# Patient Record
Sex: MALE | Race: WHITE | Employment: FULL TIME | URBAN - METROPOLITAN AREA
[De-identification: names, ages, dates, MRNs, and addresses within clinical notes are randomized per-mention and may not be internally consistent; named-entity substitution may affect disease eponyms.]

---

## 2021-08-30 ENCOUNTER — APPOINTMENT (OUTPATIENT)
Dept: GENERAL RADIOLOGY | Age: 41
End: 2021-08-30
Attending: EMERGENCY MEDICINE
Payer: COMMERCIAL

## 2021-08-30 ENCOUNTER — HOSPITAL ENCOUNTER (EMERGENCY)
Age: 41
Discharge: HOME | End: 2021-08-31
Attending: EMERGENCY MEDICINE
Payer: COMMERCIAL

## 2021-08-30 DIAGNOSIS — R07.89 ATYPICAL CHEST PAIN: Primary | ICD-10-CM

## 2021-08-30 DIAGNOSIS — R07.89 MUSCULOSKELETAL CHEST PAIN: ICD-10-CM

## 2021-08-30 LAB
ALBUMIN SERPL-MCNC: 4.3 G/DL (ref 3.5–5)
ALBUMIN/GLOB SERPL: 1 {RATIO} (ref 1.2–3.5)
ALP SERPL-CCNC: 132 U/L (ref 50–136)
ALT SERPL-CCNC: 97 U/L (ref 12–65)
ANION GAP SERPL CALC-SCNC: 9 MMOL/L (ref 7–16)
AST SERPL-CCNC: 28 U/L (ref 15–37)
ATRIAL RATE: 82 BPM
BASOPHILS # BLD: 0.1 K/UL (ref 0–0.2)
BASOPHILS NFR BLD: 1 % (ref 0–2)
BILIRUB SERPL-MCNC: 0.4 MG/DL (ref 0.2–1.1)
BUN SERPL-MCNC: 11 MG/DL (ref 6–23)
CALCIUM SERPL-MCNC: 9.4 MG/DL (ref 8.3–10.4)
CALCULATED P AXIS, ECG09: 52 DEGREES
CALCULATED R AXIS, ECG10: 26 DEGREES
CALCULATED T AXIS, ECG11: 35 DEGREES
CHLORIDE SERPL-SCNC: 104 MMOL/L (ref 98–107)
CO2 SERPL-SCNC: 23 MMOL/L (ref 21–32)
CREAT SERPL-MCNC: 0.96 MG/DL (ref 0.8–1.5)
DIAGNOSIS, 93000: NORMAL
DIFFERENTIAL METHOD BLD: NORMAL
EOSINOPHIL # BLD: 0.3 K/UL (ref 0–0.8)
EOSINOPHIL NFR BLD: 3 % (ref 0.5–7.8)
ERYTHROCYTE [DISTWIDTH] IN BLOOD BY AUTOMATED COUNT: 12.5 % (ref 11.9–14.6)
GLOBULIN SER CALC-MCNC: 4.1 G/DL (ref 2.3–3.5)
GLUCOSE SERPL-MCNC: 221 MG/DL (ref 65–100)
HCT VFR BLD AUTO: 48.5 % (ref 41.1–50.3)
HGB BLD-MCNC: 16.7 G/DL (ref 13.6–17.2)
IMM GRANULOCYTES # BLD AUTO: 0 K/UL (ref 0–0.5)
IMM GRANULOCYTES NFR BLD AUTO: 0 % (ref 0–5)
LIPASE SERPL-CCNC: 118 U/L (ref 73–393)
LYMPHOCYTES # BLD: 3.5 K/UL (ref 0.5–4.6)
LYMPHOCYTES NFR BLD: 32 % (ref 13–44)
MAGNESIUM SERPL-MCNC: 2.2 MG/DL (ref 1.8–2.4)
MCH RBC QN AUTO: 30 PG (ref 26.1–32.9)
MCHC RBC AUTO-ENTMCNC: 34.4 G/DL (ref 31.4–35)
MCV RBC AUTO: 87.1 FL (ref 79.6–97.8)
MONOCYTES # BLD: 0.6 K/UL (ref 0.1–1.3)
MONOCYTES NFR BLD: 6 % (ref 4–12)
NEUTS SEG # BLD: 6.4 K/UL (ref 1.7–8.2)
NEUTS SEG NFR BLD: 59 % (ref 43–78)
NRBC # BLD: 0 K/UL (ref 0–0.2)
P-R INTERVAL, ECG05: 154 MS
PLATELET # BLD AUTO: 239 K/UL (ref 150–450)
PMV BLD AUTO: 11.8 FL (ref 9.4–12.3)
POTASSIUM SERPL-SCNC: 3.7 MMOL/L (ref 3.5–5.1)
PROT SERPL-MCNC: 8.4 G/DL (ref 6.3–8.2)
Q-T INTERVAL, ECG07: 366 MS
QRS DURATION, ECG06: 112 MS
QTC CALCULATION (BEZET), ECG08: 427 MS
RBC # BLD AUTO: 5.57 M/UL (ref 4.23–5.6)
SODIUM SERPL-SCNC: 136 MMOL/L (ref 138–145)
TROPONIN-HIGH SENSITIVITY: 6.8 PG/ML (ref 0–14)
TROPONIN-HIGH SENSITIVITY: 7.2 PG/ML (ref 0–14)
VENTRICULAR RATE, ECG03: 82 BPM
WBC # BLD AUTO: 11 K/UL (ref 4.3–11.1)

## 2021-08-30 PROCEDURE — 83735 ASSAY OF MAGNESIUM: CPT

## 2021-08-30 PROCEDURE — 71046 X-RAY EXAM CHEST 2 VIEWS: CPT

## 2021-08-30 PROCEDURE — 93005 ELECTROCARDIOGRAM TRACING: CPT | Performed by: EMERGENCY MEDICINE

## 2021-08-30 PROCEDURE — 85025 COMPLETE CBC W/AUTO DIFF WBC: CPT

## 2021-08-30 PROCEDURE — 99283 EMERGENCY DEPT VISIT LOW MDM: CPT

## 2021-08-30 PROCEDURE — 84484 ASSAY OF TROPONIN QUANT: CPT

## 2021-08-30 PROCEDURE — 83690 ASSAY OF LIPASE: CPT

## 2021-08-30 PROCEDURE — 80053 COMPREHEN METABOLIC PANEL: CPT

## 2021-08-30 RX ORDER — CYCLOBENZAPRINE HCL 10 MG
10 TABLET ORAL
Status: DISCONTINUED | OUTPATIENT
Start: 2021-08-30 | End: 2021-08-31 | Stop reason: HOSPADM

## 2021-08-30 RX ORDER — SODIUM CHLORIDE 0.9 % (FLUSH) 0.9 %
5-10 SYRINGE (ML) INJECTION AS NEEDED
Status: DISCONTINUED | OUTPATIENT
Start: 2021-08-30 | End: 2021-08-31 | Stop reason: HOSPADM

## 2021-08-30 RX ORDER — SODIUM CHLORIDE 0.9 % (FLUSH) 0.9 %
5-10 SYRINGE (ML) INJECTION EVERY 8 HOURS
Status: DISCONTINUED | OUTPATIENT
Start: 2021-08-30 | End: 2021-08-31 | Stop reason: HOSPADM

## 2021-08-30 RX ORDER — CYCLOBENZAPRINE HCL 10 MG
10 TABLET ORAL
Qty: 13 TABLET | Refills: 0 | Status: SHIPPED | OUTPATIENT
Start: 2021-08-30

## 2021-08-30 RX ADMIN — Medication 5 ML: at 23:55

## 2021-08-30 NOTE — ED TRIAGE NOTES
Arrives with face mask in place. Reports left sided chest pain radiating into back left side of neck. Onset 3 days pta. States constant over last 2 days and describes as \"throbbing\". Associated nausea, shortness of breath, diaphoresis. Increased pain with movement/inspiration. Denies vomiting/diarrhea/fever/chills/cough. +smoker. Followed by [employer]. Reportedly completed shift at work today.

## 2021-08-31 VITALS
BODY MASS INDEX: 37.22 KG/M2 | TEMPERATURE: 98.2 F | HEIGHT: 74 IN | WEIGHT: 290 LBS | HEART RATE: 70 BPM | DIASTOLIC BLOOD PRESSURE: 59 MMHG | OXYGEN SATURATION: 96 % | SYSTOLIC BLOOD PRESSURE: 104 MMHG | RESPIRATION RATE: 20 BRPM

## 2021-08-31 NOTE — ED NOTES
I have reviewed discharge instructions with the patient. The patient verbalized understanding. Patient left ED via Discharge Method: ambulatory to Home     Opportunity for questions and clarification provided. Patient given 1 scripts. To continue your aftercare when you leave the hospital, you may receive an automated call from our care team to check in on how you are doing.  This is a free service and part of our promise to provide the best care and service to meet your aftercare needs. \" If you have questions, or wish to unsubscribe from this service please call [phone].  Thank you for Choosing our [address] [place] [address] Emergency Department.

## 2021-08-31 NOTE — ED PROVIDER NOTES
Patient presents the ER with complaints of chest discomfort. States since Friday he has had recurrence of chest discomfort particularly in his left chest.  Describes it as a tightness. States it got worse today. Reports he has had pain in the past.  Denies any direct trauma. The history is provided by the patient. Chest Pain   This is a recurrent problem. The current episode started 2 days ago. The problem has not changed since onset. The pain is present in the left side. The pain is at a severity of 4/10. The pain is moderate. The pain does not radiate. Associated symptoms include malaise/fatigue. Pertinent negatives include no abdominal pain, no back pain, no diaphoresis, no fever, no headaches, no leg pain, no nausea, no near-syncope, no orthopnea, no PND, no shortness of breath, no vomiting and no weakness. He has tried nothing for the symptoms. Past Medical History:   Diagnosis Date    Asthma        No past surgical history on file. No family history on file. Social History     Socioeconomic History    Marital status: SINGLE     Spouse name: Not on file    Number of children: Not on file    Years of education: Not on file    Highest education level: Not on file   Occupational History    Not on file   Tobacco Use    Smoking status: Former Smoker     Quit date: 11/1/2010     Years since quitting: 10.8   Substance and Sexual Activity    Alcohol use: No    Drug use: No    Sexual activity: Not on file   Other Topics Concern    Not on file   Social History Narrative    Not on file     Social Determinants of Health     Financial Resource Strain:     Difficulty of Paying Living Expenses:    Food Insecurity:     Worried About Running Out of Food in the Last Year:     [address] in the Last Year:    Transportation Needs:     Lack of Transportation (Medical):      Lack of Transportation (Non-Medical):    Physical Activity:     Days of Exercise per Week:     Minutes of Exercise per Session:    Stress:     Feeling of Stress :    Social Connections:     Frequency of Communication with Friends and Family:     Frequency of Social Gatherings with Friends and Family:     Attends Sikhism Services:     Active Member of Clubs or Organizations:     Attends Club or Organization Meetings:     Marital Status:    Intimate Partner Violence:     Fear of Current or Ex-Partner:     Emotionally Abused:     Physically Abused:     Sexually Abused: ALLERGIES: Patient has no known allergies. Review of Systems   Constitutional: Positive for malaise/fatigue. Negative for diaphoresis and fever. HENT: Negative for congestion, dental problem, trouble swallowing and voice change. Eyes: Negative for photophobia and redness. Respiratory: Negative for choking, chest tightness, shortness of breath and stridor. Cardiovascular: Positive for chest pain. Negative for orthopnea, leg swelling, PND and near-syncope. Gastrointestinal: Negative for abdominal pain, nausea and vomiting. Endocrine: Negative for polydipsia, polyphagia and polyuria. Genitourinary: Negative for penile swelling and scrotal swelling. Musculoskeletal: Negative for back pain and gait problem. Skin: Negative for color change and pallor. Neurological: Positive for light-headedness. Negative for seizures, speech difficulty, weakness and headaches. Hematological: Negative for adenopathy. Does not bruise/bleed easily. Psychiatric/Behavioral: Negative for behavioral problems and confusion. All other systems reviewed and are negative. Vitals:    08/30/21 1927   BP: (!) 141/95   Pulse: 95   Resp: 20   Temp: 98.2 °F (36.8 °C)   SpO2: 96%   Weight: 131.5 kg (290 lb)   Height: 6' 2\" (1.88 m)            Physical Exam  Vitals and nursing note reviewed. Constitutional:       General: He is not in acute distress. Appearance: Normal appearance. He is not ill-appearing.    HENT:      Head: Normocephalic and atraumatic. Right Ear: External ear normal.      Left Ear: External ear normal.      Nose: Nose normal. No congestion. Mouth/Throat:      Mouth: Mucous membranes are moist.   Eyes:      Extraocular Movements: Extraocular movements intact. Pupils: Pupils are equal, round, and reactive to light. Cardiovascular:      Rate and Rhythm: Normal rate and regular rhythm. Pulses: Normal pulses. Heart sounds: Normal heart sounds. No murmur heard. No friction rub. Pulmonary:      Effort: Pulmonary effort is normal. No respiratory distress. Breath sounds: Normal breath sounds. No stridor. No wheezing. Abdominal:      General: Abdomen is flat. There is no distension. Palpations: Abdomen is soft. There is no mass. Tenderness: There is no abdominal tenderness. Hernia: No hernia is present. Musculoskeletal:         General: No swelling, tenderness, deformity or signs of injury. Normal range of motion. Cervical back: Normal range of motion and neck supple. No rigidity or tenderness. Skin:     General: Skin is warm. Coloration: Skin is not jaundiced or pale. Neurological:      General: No focal deficit present. Mental Status: He is alert and oriented to person, place, and time. Cranial Nerves: No cranial nerve deficit. Sensory: No sensory deficit. Motor: No weakness. Coordination: Coordination normal.   Psychiatric:         Mood and Affect: Mood normal.         Behavior: Behavior normal.          MDM  Number of Diagnoses or Management Options  Diagnosis management comments: This appears to be a chronic recurrent issue for patient. Plan for screening labs EKG etc. cardiac enzymes. Low suspicion for acute coronary syndrome      10:15 PM  Initial EKG, troponin, and labs normal.  Plan for serial cardiac enzymes    11:52 PM  Cardiac enzymes are negative x2.   Patient reports pain appears to be worse with movement and certain positions    Plan to discharge home. Given musculoskeletal chest pain precautions       Amount and/or Complexity of Data Reviewed  Clinical lab tests: ordered and reviewed  Tests in the radiology section of CPT®: ordered and reviewed  Decide to obtain previous medical records or to obtain history from someone other than the patient: yes (Kalli Vidal - 10/01/2020 1:00 PM EDT  Formatting of this note is different from the original.  Images from the original note were not included. [facility]   [address], [address]  Phone- [phone]  Fax- [phone]    Kwadwo Angles  1980    Chief Complaint   Patient presents with    Follow-up   Pain in left side of chest for the past week    Medication Management   Pt verbalized med list, no refills    Unstable angina     Patient Active Problem List   Diagnosis    Right hand pain    Overweight    Fatigue    Increased frequency of urination    Fracture of metacarpal bone    Weakness    Chest pain    Tobacco abuse    ASHOK (obstructive sleep apnea)    Shortness of breath    Unstable angina (HCC)     HPI:   Mr. Brett Holland is a 45year old male patient of [facility] who presents today with recurrent chest pain. Mr. Brett Holland has a history of chronic chest pain syndrome. He has had a very extensive cardiac evaluation including a cardiac catheterization showing normal coronary arteries. He continues to have symptoms for left chest wall pain that radiates both to his arm and jaw. He had similar complaints in February of 2020 and at that time we checked a sed rate and CRP as well as an echocardiogram. No evidence of any structural heart disease, pericardial effusion or pericarditis. He did get some symptomatic relief with Indomethacin. We also obtained a cardiac event monitor to see if there was any related tachycardia and this did not show any evidence of this. Mr. Brett Holland states that symptoms started up again about a week ago.  It has been intermittent but the last 24 hours has been constant. It is not associated with any shortness of breath. He has chronic fatigue and decreased exercise tolerance. He has remained hemodynamically stable with good blood pressure and heart rate. He can find no potential trigger or alleviating factor for his symptoms. He was sent home from work today to see his cardiologist for evaluation of symptomatology. A 12 lead EKG was obtained upon his arrival and this demonstrated a normal sinus rhythm with a heart rate of 68 beats per minute. He has a nonspecific interventricular conduction delay and nonspecific ST-T wave abnormalities. His EKG is unchanged from prior tracings and does not have any evidence of acute ischemic changes. No Known Allergies    Current Outpatient Medications   Medication Sig Dispense Refill    ibuprofen (MOTRIN) 200 mg tablet 600 mg by Oral/Gastric Tube route    APAP, auto-PAP, Use as instructed. APAP 5-15 cmh20 1 each 0    diclofenac (CATAFLAM) 50 MG tablet Take 1 tablet (50 mg) by mouth 3 (three) times a day as needed (pain) for up to 10 days 30 tablet 0    diclofenac sodium (VOLTAREN) 1 % gel Apply 1-2 g topically 3 (three) times a day (Patient not taking: Reported on 7/18/2020     ) 100 g 0    nitroglycerin (NITROSTAT) 0.4 mg SL tablet Place 1 tablet (0.4 mg) under the tongue every 5 (five) minutes as needed for chest pain. (Patient not taking: Reported on 7/18/2020     ) 30 tablet 1    omeprazole (PriLOSEC) 20 MG capsule Take 1 capsule (20 mg) by mouth 2 (two) times a day 60 capsule 11     No current facility-administered medications for this visit. Review of Systems   Review of Systems   Constitutional: Positive for fatigue. Negative for activity change and unexpected weight change. HENT: Negative for trouble swallowing and voice change. Respiratory: Negative for cough, chest tightness, shortness of breath and wheezing. Cardiovascular: Positive for chest pain. Negative for palpitations and leg swelling. Gastrointestinal: Negative for abdominal pain and blood in stool. Genitourinary: Negative for dysuria and hematuria. Musculoskeletal: Negative for myalgias. Skin: Negative for pallor. Neurological: Negative for dizziness, syncope, weakness and light-headedness. Psychiatric/Behavioral: Negative for dysphoric mood. The patient is not nervous/anxious. Past Medical History  Past Medical History:   Diagnosis Date    Fatigue    Fracture of metacarpal bone    Increased frequency of urination    Overweight    Right hand pain    Weakness   dizzy episodes     Past Surgical History  History reviewed. No pertinent surgical history. Past Family History  Family History   Problem Relation Age of Onset    Diabetes Mother    Anxiety disorder Mother    Diabetes Brother     Past Social History  Social History     Tobacco Use    Smoking status: Current Every Day Smoker   Packs/day: 2.00   Years: 19.00   Pack years: 38.00    Smokeless tobacco: Never Used    Tobacco comment: States he is thinking about quitting. Urged to quit completely 8/24/16   Substance Use Topics    Alcohol use: No   Alcohol/week: 0.0 standard drinks    Drug use: No     Physical Exam  /68 (BP Location: Right arm, Patient Position: Supine)  Pulse 74  Ht 1.88 m (6' 2\")  Wt 134 kg (294 lb 6.4 oz)  BMI 37.80 kg/m²   Physical Exam   Constitutional: He is oriented to person, place, and time. He appears well-developed and well-nourished. No distress. HENT:   Head: Normocephalic and atraumatic. Mouth/Throat: No oropharyngeal exudate. Eyes: Pupils are equal, round, and reactive to light. Conjunctivae are normal. No scleral icterus. Neck: Normal range of motion. Neck supple. No JVD present. Cardiovascular: Normal rate and regular rhythm. Exam reveals no gallop and no friction rub. No murmur heard. Pulmonary/Chest: Effort normal and breath sounds normal. No respiratory distress. Abdominal: Soft. He exhibits no distension. Musculoskeletal: Normal range of motion. General: No edema. Neurological: He is alert and oriented to person, place, and time. Skin: Skin is warm and dry. No pallor. Psychiatric: He has a normal mood and affect. His behavior is normal. Judgment and thought content normal.     Assessment :  1. Chronic chest pain syndrome. I don't have any objective evidence that Mr. Haritha Escalante is having cardiac chest pain. I suspect that this is either related to a gastroenterology etiology and/or musculoskeletal pain. Going forward, I am giving him a short course of a nonsteroidal, proton pump inhibitor and I am referring him to our gastroenterology colleagues for further evaluation. The other option here if the nonsteroidals are not effective is to try a Lidocaine patch. 2. Normal coronary arteries by cardiac catheterization. Plan:   1. Prilosec 20 mg po bid. 2. Diclofenac 50 mg po q 8 hours as needed for ten days. 3. Referral to [facility] for evaluation of noncardiac chest pain. 4. Further recommendations and disposition will depend on the outcome of above.      Mary Clark, Rhianna Pappascomfarhan JaureguiWoodbury [facility]iology Consultants  [phone]    Transcribed by Luther Hanna for WES Burrows    Electronically signed by WES Judd at 10/01/2020 5:09 PM EDT    )  Review and summarize past medical records: yes  Independent visualization of images, tracings, or specimens: yes (EKG interpretation, sinus rhythm, rate of 82, normal axis, no blocks, no acute ST segment changes noted)    Risk of Complications, Morbidity, and/or Mortality  Presenting problems: moderate  Diagnostic procedures: moderate  Management options: high           Procedures               Results Include:    Recent Results (from the past 24 hour(s))   EKG, 12 LEAD, INITIAL    Collection Time: 08/30/21  7:28 PM   Result Value Ref Range    Ventricular Rate 82 BPM    Atrial Rate 82 BPM P-R Interval 154 ms    QRS Duration 112 ms    Q-T Interval 366 ms    QTC Calculation (Bezet) 427 ms    Calculated P Axis 52 degrees    Calculated R Axis 26 degrees    Calculated T Axis 35 degrees    Diagnosis       Normal sinus rhythm with sinus arrhythmia  Incomplete left bundle branch block  Possible Anterior infarct , age undetermined  No previous ECGs available  Confirmed by Malgorzata Alexandre MD, Asp ([id number]) on 8/30/2021 9:31:44 PM     TROPONIN-HIGH SENSITIVITY    Collection Time: 08/30/21  7:32 PM   Result Value Ref Range    Troponin-High Sensitivity 6.8 0 - 14 pg/mL   CBC WITH AUTOMATED DIFF    Collection Time: 08/30/21  7:32 PM   Result Value Ref Range    WBC 11.0 4.3 - 11.1 K/uL    RBC 5.57 4.23 - 5.6 M/uL    HGB 16.7 13.6 - 17.2 g/dL    HCT 48.5 41.1 - 50.3 %    MCV 87.1 79.6 - 97.8 FL    MCH 30.0 26.1 - 32.9 PG    MCHC 34.4 31.4 - 35.0 g/dL    RDW 12.5 11.9 - 14.6 %    PLATELET 058 291 - 168 K/uL    MPV 11.8 9.4 - 12.3 FL    ABSOLUTE NRBC 0.00 0.0 - 0.2 K/uL    DF AUTOMATED      NEUTROPHILS 59 43 - 78 %    LYMPHOCYTES 32 13 - 44 %    MONOCYTES 6 4.0 - 12.0 %    EOSINOPHILS 3 0.5 - 7.8 %    BASOPHILS 1 0.0 - 2.0 %    IMMATURE GRANULOCYTES 0 0.0 - 5.0 %    ABS. NEUTROPHILS 6.4 1.7 - 8.2 K/UL    ABS. LYMPHOCYTES 3.5 0.5 - 4.6 K/UL    ABS. MONOCYTES 0.6 0.1 - 1.3 K/UL    ABS. EOSINOPHILS 0.3 0.0 - 0.8 K/UL    ABS. BASOPHILS 0.1 0.0 - 0.2 K/UL    ABS. IMM.  GRANS. 0.0 0.0 - 0.5 K/UL   METABOLIC PANEL, COMPREHENSIVE    Collection Time: 08/30/21  7:32 PM   Result Value Ref Range    Sodium 136 (L) 138 - 145 mmol/L    Potassium 3.7 3.5 - 5.1 mmol/L    Chloride 104 98 - 107 mmol/L    CO2 23 21 - 32 mmol/L    Anion gap 9 7 - 16 mmol/L    Glucose 221 (H) 65 - 100 mg/dL    BUN 11 6 - 23 MG/DL    Creatinine 0.96 0.8 - 1.5 MG/DL    GFR est AA >60 >60 ml/min/1.73m2    GFR est non-AA >60 >60 ml/min/1.73m2    Calcium 9.4 8.3 - 10.4 MG/DL    Bilirubin, total 0.4 0.2 - 1.1 MG/DL    ALT (SGPT) 97 (H) 12 - 65 U/L    AST (SGOT) 28 15 - 37 U/L    Alk. phosphatase 132 50 - 136 U/L    Protein, total 8.4 (H) 6.3 - 8.2 g/dL    Albumin 4.3 3.5 - 5.0 g/dL    Globulin 4.1 (H) 2.3 - 3.5 g/dL    A-G Ratio 1.0 (L) 1.2 - 3.5     LIPASE    Collection Time: 08/30/21  7:32 PM   Result Value Ref Range    Lipase 118 73 - 393 U/L   MAGNESIUM    Collection Time: 08/30/21  7:32 PM   Result Value Ref Range    Magnesium 2.2 1.8 - 2.4 mg/dL   TROPONIN-HIGH SENSITIVITY    Collection Time: 08/30/21 10:09 PM   Result Value Ref Range    Troponin-High Sensitivity 7.2 0 - 14 pg/mL     Voice dictation software was used during the making of this note. This software is not perfect and grammatical and other typographical errors may be present. This note has been proofread, but may still contain errors. Jeri Ruano MD; 8/30/2021 @11:52 PM   ===================================================================    I wore appropriate PPE throughout this patient's ED visit.  Jeri Ruano MD, 11:52 PM

## 2021-08-31 NOTE — DISCHARGE INSTRUCTIONS
Take medications as prescribed  Expect to have continued stiffness and soreness  Follow-up with your primary care physician as well as your cardiologist  Return to the ER for any new, worsening or life-threatening symptoms

## 2022-08-05 ENCOUNTER — TELEPHONE (OUTPATIENT)
Dept: ORTHOPEDIC SURGERY | Age: 42
End: 2022-08-05

## 2022-08-05 NOTE — TELEPHONE ENCOUNTER
Waiting on disc from [address]  pt has  herniated  disc   Has  a year ole  mri  and he is  asking  to get a new  mri  made by  [facility]  Pt told to  bring  disc  for  review  when he  gets the  disc  from [facility]     Attn to  taylor in appts and  then will send  to  [facility]  for  review

## 2022-08-16 ENCOUNTER — TELEPHONE (OUTPATIENT)
Dept: ORTHOPEDIC SURGERY | Age: 42
End: 2022-08-16

## 2022-08-16 NOTE — TELEPHONE ENCOUNTER
Per  olaf jiang to  see this  pt  for  surgical consult    We  do not have a good phone  number  for  pt.   If  he calls back - ok to schedule

## 2022-08-17 ENCOUNTER — TELEPHONE (OUTPATIENT)
Dept: ORTHOPEDIC SURGERY | Age: 42
End: 2022-08-17

## 2022-08-17 NOTE — TELEPHONE ENCOUNTER
Disregard the  message  about  ok to  schedule / and wrong  phone  number. That was for a different  patient. This pt  is aware  and he says he  will bring  his  disc and  leave  it  for  [facility]  to  review and was told  he can ask [facility] to fax over  the  results. [facility]  can  look at  either but pt  said  he  will bring  the  disc .  Probably to  [address] [facility] [address]     Please  send  disc  to  Teresa Song in appts  when  pt  brings it  in

## 2022-08-29 ENCOUNTER — TELEPHONE (OUTPATIENT)
Dept: ORTHOPEDIC SURGERY | Age: 42
End: 2022-08-29

## 2022-09-23 ENCOUNTER — OFFICE VISIT (OUTPATIENT)
Dept: ORTHOPEDIC SURGERY | Age: 42
End: 2022-09-23

## 2022-09-23 ENCOUNTER — TELEPHONE (OUTPATIENT)
Dept: ORTHOPEDIC SURGERY | Age: 42
End: 2022-09-23

## 2022-09-23 VITALS — BODY MASS INDEX: 34.78 KG/M2 | WEIGHT: 271 LBS | HEIGHT: 74 IN

## 2022-09-23 DIAGNOSIS — M54.16 LUMBAR RADICULOPATHY: ICD-10-CM

## 2022-09-23 DIAGNOSIS — M43.06 PARS DEFECT OF LUMBAR SPINE: ICD-10-CM

## 2022-09-23 DIAGNOSIS — M54.50 LOW BACK PAIN, UNSPECIFIED BACK PAIN LATERALITY, UNSPECIFIED CHRONICITY, UNSPECIFIED WHETHER SCIATICA PRESENT: Primary | ICD-10-CM

## 2022-09-23 DIAGNOSIS — M43.16 SPONDYLOLISTHESIS OF LUMBAR REGION: ICD-10-CM

## 2022-09-23 PROCEDURE — 99204 OFFICE O/P NEW MOD 45 MIN: CPT | Performed by: NURSE PRACTITIONER

## 2022-09-23 RX ORDER — ATORVASTATIN CALCIUM 20 MG/1
TABLET, FILM COATED ORAL
COMMUNITY

## 2022-09-23 RX ORDER — PANTOPRAZOLE SODIUM 40 MG/1
TABLET, DELAYED RELEASE ORAL
COMMUNITY
Start: 2022-03-15

## 2022-09-23 RX ORDER — NITROGLYCERIN 0.4 MG/1
0.4 TABLET SUBLINGUAL EVERY 5 MIN PRN
COMMUNITY
Start: 2018-03-21

## 2022-09-23 RX ORDER — HYDROCODONE BITARTRATE AND ACETAMINOPHEN 7.5; 325 MG/1; MG/1
1 TABLET ORAL EVERY 8 HOURS PRN
Qty: 15 TABLET | Refills: 0 | Status: SHIPPED | OUTPATIENT
Start: 2022-09-23 | End: 2022-09-28

## 2022-09-23 RX ORDER — PREGABALIN 75 MG/1
75 CAPSULE ORAL 2 TIMES DAILY
Qty: 60 CAPSULE | Refills: 0 | Status: SHIPPED | OUTPATIENT
Start: 2022-09-23 | End: 2022-10-23

## 2022-09-23 RX ORDER — DULAGLUTIDE 1.5 MG/.5ML
1.5 INJECTION, SOLUTION SUBCUTANEOUS
COMMUNITY

## 2022-09-23 NOTE — PROGRESS NOTES
Name: Gustavo Sam  YOB: 1980  Gender: male  MRN: [id number]    CC: Back and left leg pain       HPI: This is a 40y.o. year old male who was referred by [facility] for advanced management of pain. Patient has had complaints of back and neck pain for some time. He initially was in pain management with [facility]. He had surgical evaluation in 2021 with Dr. Marina Vann who per his note appears to have reviewed an MRI from 2019 where patient had a left-sided disc herniation at L3. Over the past 2 years patient has had chronic complaints of low back pain. He does remember an incident where he pulled greatly on something and felt a pop and had severe back pain for about 6 weeks. That resolved but through the years patient has developed significant left lower back pain rating down the posterior leg to his knee. He underwent an epidural injection in pain management which gave him great relief for about 2 weeks. He has tried multiple avenues of medication. He currently is finding relief with hydrocodone. He unfortunately is out of medications that was given to him by his pain management physician. He was referred to us for surgical evaluation. Up-to-date MRI of the lumbar spine revealed spondylolysis at L5 on S1 with central disc protrusion no significant stenosis but there was of foraminal narrowing. There is also a left-sided L3 disc protrusion. This patient  has not had lumbar surgery in the past.     Thus far, the patient has tried NSAIDS, muscle relaxers, gabapentin or lyrica, opiod pain medicine, spine injections , physician directed home exercise program, and physical therapy 1 year. Patient is a diabetic but now is controlled. He stated he was told he had chronic kidney disease but last creatinine appears to be within normal limits. Patient thus far per records has tried: Diclofenac, Celebrex, Cymbalta, Elavil, meloxicam, Norflex, gabapentin. He did not tolerate gabapentin.   Made him feel weird. He is also taking hydrocodone. AMB PAIN ASSESSMENT 9/23/2022   Location of Pain Back   Location Modifiers Left   Severity of Pain 8   Frequency of Pain Constant   Limiting Behavior Yes   Result of Injury No   Work-Related Injury No   Are there other pain locations you wish to document? No            ROS/Meds/PSH/PMH/FH/SH: I personally reviewed the patient's collected intake data. Below are the pertinents: diabetes    No Known Allergies      Current Outpatient Medications:     nitroGLYCERIN (NITROSTAT) 0.4 MG SL tablet, Place 0.4 mg under the tongue every 5 minutes as needed, Disp: , Rfl:     pantoprazole (PROTONIX) 40 MG tablet, pantoprazole 40 mg tablet,delayed release  TAKE 1 TABLET BY MOUTH EVERY DAY, Disp: , Rfl:     HYDROcodone-acetaminophen (NORCO) 7.5-325 MG per tablet, Take 1 tablet by mouth every 8 hours as needed for Pain for up to 5 days. Intended supply: 5 days. Take lowest dose possible to manage pain, Disp: 15 tablet, Rfl: 0    pregabalin (LYRICA) 75 MG capsule, Take 1 capsule by mouth 2 times daily for 30 days. , Disp: 60 capsule, Rfl: 0    cyclobenzaprine (FLEXERIL) 10 MG tablet, Take 10 mg by mouth 3 times daily as needed, Disp: , Rfl:     atorvastatin (LIPITOR) 20 MG tablet, Lipitor 20 mg tablet  Take 1 tablet every day by oral route for 30 days. , Disp: , Rfl:     Dulaglutide (TRULICITY) 1.5 QY/3.5DD SOPN, 1.5 mg, Disp: , Rfl:     No past surgical history on file. There is no problem list on file for this patient. Tobacco:  reports that he quit smoking about 11 years ago. He does not have any smokeless tobacco history on file.   Alcohol:   Social History     Substance and Sexual Activity   Alcohol Use No        Physical Exam:     GENERAL:  Adult in no acute distress, well developed, well nourished Patient is appropriately conversant  MSK:  Examination of the lumbar spine reveals paraspinal tenderness    There is moderate tenderness to palpation along the spinous processes and paraspinal musculature. The patient ambulates with a normal gait. ROM of bilateral hip(s) reveals no irritability. NEURO:  Cranial nerves grossly intact. No motor deficits. Straight leg testing is positive left  Sensory testing reveals intact sensation to light touch and in the distribution of the L3-S1 dermatomes bilaterally  Ankle jerk is negative for clonus    Reflexes   Right Left   Quadriceps (L4) 2 2   Achilles (S1) 2 2     Strength testing in the lower extremity reveals the following based on the 5 point grading scale:     HF (L2) H Ab (L5) KE (L3/4) ADF (L4) EHL (L5) A Ev (S1) APF (S1)   Right 5 5 5 5 5 5 5   Left 5 5 5 5 5 5 5     PSYCH:  Alert and oriented X 3. Appropriate affect. Intact judgment and insight. Radiographic Studies:     AP, lateral and spot views of the lumbar spine:      X-rays including AP and lateral views of the lumbar spine reviewed today with flexion and extension: There is an obvious pars defect at L5 with a 7 mm spondylolisthesis of L5 on S1. With extension and extends to about 8.6 mm. There is some degree of associated disc degeneration. Bilateral hips are visualized and appear to be well preserved. Impression: Isthmic spondylolisthesis of L5 on S1 with increased movement with flexion and extension. MRI of the lumbar spine images independently reviewed:    Chronic pars defects at L5 results in stable grade 1 anterolisthesis L5 on S1 measuring 5 mm. Conus tip terminates atT12. No retroperitoneal mass nor lymphadenopathy present. Lower thoracic spine:  T11-12 mass effect on the anterior cord appears stable without cord edema. No significant neural foraminal narrowing at this level   L1-2:  No significant central canal nor neural foraminal narrowing. L2-3:  No significant central canal nor neural foraminal narrowing. L3-4:  Mild annular bulging slightly asymmetric on the left with endplate osteophyte formation.  Small left foraminal protrusion moderately narrows left neural foramen. This is stable. No right neural foraminal narrowing. L4-5:  No significant central canal nor neural foraminal narrowing. L5-S1:  Chronic pars defects with grade 1 anterolisthesis. Diffuse annular bulging present as there is a small central disc extrusion with disc material extending cranially 5 mm along posterior margin the L5 vertebral body. Mild flattening of the right L5 nerve root in the neural foramen related to bulging disc material stable from prior study. No significant left neural foraminal narrowing.-Did review these images myself and I do see some compression of the left L5 nerve. I suspect when he is standing this is greater in nature. Assessment/Plan:       Diagnosis Orders   1. Low back pain, unspecified back pain laterality, unspecified chronicity, unspecified whether sciatica present  XR LUMBAR SPINE (2-3 VIEWS)      2. Lumbar radiculopathy  HYDROcodone-acetaminophen (NORCO) 7.5-325 MG per tablet    pregabalin (LYRICA) 75 MG capsule      3. Pars defect of lumbar spine  HYDROcodone-acetaminophen (NORCO) 7.5-325 MG per tablet    pregabalin (LYRICA) 75 MG capsule      4. Spondylolisthesis of lumbar region  HYDROcodone-acetaminophen (NORCO) 7.5-325 MG per tablet            This patient's clinical history and physical exam is consistent with neurogenic claudication which is likely due to lumbar stenosis and spondylolisthesis. Has an isthmic spondylolisthesis of L5 on S1 with increase in movement with flexion and extension. Patient is symptomatic of left L5 radiculopathy. We discussed options. I told him he is a candidate for an anterior posterior lumbar fusion surgery at L5-S1. We went over this in detail. We also discussed that he would be out of work and would have a total of a 3 to 6-month recovery. Unfortunately he is going to have to plan for this. In the interim he is tried multiple dynamics of medication.   I do think he needs to return back to pain management if he is not going to proceed with surgical intervention at the current time. I would recommend a left L5 selective nerve root block. I will add Lyrica to his regimen to see if he tolerates this. I will start him out on 75 p.o. twice daily. I will also prescribe a short duration of hydrocodone until he is able to get back into pain management. We did speak with a representative for pain management and they are aware that we are doing this. I discussed the natural history of lumbar stenosis in that it is a degenerative condition that is usually slowly progressive resulting in gradual loss of mobility. I reassured the patient that this is not a condition that typically predisposes him to an acute paraplegia; however, the more neurologic deficits he acquires and the longer they go untreated, the less likely he is to have neurological improvements after an operation. He understands that conservative treatments typically include physical therapy, oral and/or epidural steroids, pain management, and simple observation. And, that these treatments do not address the anatomical pinching of the spinal nerves, but rather help patients cope with the resulting symptoms. I also discussed potential surgical if the symptoms fail to improve or there is a progressive neurologic deficit or conservative management has been exhausted. - Neuropathic pain treatment: For neuropathic pain relief the patient was given a prescription and counseled regarding the possibility of risks and complications from this medication.  - Opioid: The patient was prescribed an oral pain medication. The patient understands that this is a temporary measure to bring acute or post-surgical pain under control and that it is out of the scope of this practice to continue opiates on a long-term basis.  The [place] Controlled Substance database was reviewed prior to prescribing.   -Future surgery: If the patient fails the conservative options listed above, I believe they may be a candidate for a  L5-S1ALIF  - Referral to pain management. The patient's care would be best managed in a formal pain management setting and will be referred accordingly.  -Lumbar ALIF: We discussed the details of the surgery including an incision made in the front of the belly. A general or vascular surgeon will assist to clear a path to the spine, moving aside the vessels to the legs. The spine surgeon will then identify the disk space radiographically and removes the damaged disk. The disk   The disk space would be distracted as much as possible and measured for the appropriate sized spacer cage. This would be packed with allograft and likely bone marrow aspirate. The spacer restores the height between the bones, and relieves the pinched nerves. The graft becomes a bridge between the two bones to promote fusion. The graft is strengthened with a metal plate and screws. The abdominal structures would be inspected, and wound would then be closed by the general or vascular surgeon and covered with sterile dressings. Most patients will have a second incision made posteriorly for supplemental rods and screws. He would expect to stay in the hospital 1-2 days or until he is able to tolerate a normal diet, has return of bowel function, and can get about safely with minimal assistance. Follow-up would be scheduled for 2 weeks and there will be restrictions including no driving, and no lifting greater than 15 lbs until follow up with me. He was encouraged to walk as much as possible before and after the operation to facilitate an expeditious recovery.     We also discussed the potential risks of the surgery including, but not limited to infection, spinal fluid leak and potential headaches requiring him to remain supine post-operatively; injury to the cauda equina or peripheral nerve root resulting in weakness, numbness, or very rarely bowel or bladder dysfunction; persistent back or leg symptoms, recurrence of stenosis or the development of instability or hardware failure possibly needing additional surgery;  blood loss needing transfusion; postoperative hematoma; and the risks of anesthesia. Also there is the risk of visceral, vascular, renal, or other intra-abdominal injury, and very rarely death. The patient voiced an understanding of these issues as outlined. The procedure we will plan for is a L5-S1 anterior lumbar interbody fusion, and posterior fusion . Orders Placed This Encounter   Medications    HYDROcodone-acetaminophen (NORCO) 7.5-325 MG per tablet     Sig: Take 1 tablet by mouth every 8 hours as needed for Pain for up to 5 days. Intended supply: 5 days. Take lowest dose possible to manage pain     Dispense:  15 tablet     Refill:  0     Reduce doses taken as pain becomes manageable    pregabalin (LYRICA) 75 MG capsule     Sig: Take 1 capsule by mouth 2 times daily for 30 days. Dispense:  60 capsule     Refill:  0        Orders Placed This Encounter   Procedures    XR LUMBAR SPINE (2-3 VIEWS)        4 This is a chronic illness/condition with exacerbation and progression      Return for refer to pain management, will call when wants to proceed with surgery . MARIA ISABEL Burris - CNP  09/23/22      Elements of this note were created using speech recognition software. As such, errors of speech recognition may be present.

## 2022-09-23 NOTE — TELEPHONE ENCOUNTER
Confirmed with pharmacist they have. Spoke with patient and informed him that hes doesnot need to wait until rx runs out. [facility]. This will be the only rx we be writing.